# Patient Record
Sex: MALE | Race: WHITE | NOT HISPANIC OR LATINO | Employment: FULL TIME | URBAN - METROPOLITAN AREA
[De-identification: names, ages, dates, MRNs, and addresses within clinical notes are randomized per-mention and may not be internally consistent; named-entity substitution may affect disease eponyms.]

---

## 2018-07-20 ENCOUNTER — OFFICE VISIT (OUTPATIENT)
Dept: URGENT CARE | Facility: CLINIC | Age: 38
End: 2018-07-20
Payer: COMMERCIAL

## 2018-07-20 VITALS
DIASTOLIC BLOOD PRESSURE: 80 MMHG | HEIGHT: 71 IN | OXYGEN SATURATION: 96 % | SYSTOLIC BLOOD PRESSURE: 132 MMHG | HEART RATE: 70 BPM | TEMPERATURE: 99.1 F | BODY MASS INDEX: 35 KG/M2 | WEIGHT: 250 LBS | RESPIRATION RATE: 18 BRPM

## 2018-07-20 DIAGNOSIS — M71.331 SYNOVIAL CYST OF WRIST, RIGHT: Primary | ICD-10-CM

## 2018-07-20 PROCEDURE — 99203 OFFICE O/P NEW LOW 30 MIN: CPT | Performed by: EMERGENCY MEDICINE

## 2018-07-20 NOTE — PROGRESS NOTES
[facility] [employer] [facility]        NAME: Vilma Arango is a 34 y o  male  : 1980    MRN: [id number]  DATE: 2018  TIME: 6:50 PM    Assessment and Plan   Synovial cyst of wrist, right [M67 [id number]]  1  Synovial cyst of wrist, right  Ambulatory referral to Hand Surgery         Patient Instructions     Patient Instructions   Ganglion Cyst Removal   WHAT YOU NEED TO KNOW:   Ganglion cyst removal is surgery to remove a small fluid-filled sac that usually occurs in the hand, wrist, foot, or ankle  In some cases, it can occur in other parts of the body  DISCHARGE INSTRUCTIONS:   Follow up with your healthcare provider or surgeon as directed: You may need to return to have your stitches or bandage removed  Write down your questions so you remember to ask them during your visits  Wound care:  Keep your surgical incision clean and dry  Ask your healthcare provider or surgeon how to care for your wound and when you can bathe  Check for signs of infection, such as redness or swelling, when you clean your wound  Activities:  Avoid strenuous work and heavy lifting for several weeks or as directed by your healthcare provider or surgeon  You may be able to do light activities and light lifting  Ask what activities are okay for you to do  Contact your healthcare provider or surgeon if:   · You have a fever  · Your wound is red, swollen, or draining pus  · You have numbness or tingling of the arm, hand, leg, or foot  · You have questions or concerns about your condition or care  Seek care immediately or call 911 if:   · Blood soaks through your bandage  · You cannot move your arm, hand, leg, or foot  2017 [address] [facility] Information is for End User's use only and may not be sold, redistributed or otherwise used for commercial purposes  All illustrations and images included in CareNotes® are the copyrighted property of [employer]  or Suman Zhao    The above information is an  only  It is not intended as medical advice for individual conditions or treatments  Talk to your doctor, nurse or pharmacist before following any medical regimen to see if it is safe and effective for you  Ganglion Cysts   WHAT YOU NEED TO KNOW:   What is a ganglion cyst?  A ganglion cyst is an abnormal buildup of fluid under the skin  They are most common on the wrists, feet, or ankles, but can be found anywhere on the body  The cause is not known  You may have a higher risk for a ganglion cyst if you injure your joint  What are the signs and symptoms of a ganglion cyst?   · A round, firm lump    · A lump that changes size and may disappear or reappear    · Numbness, swelling, or muscle weakness around the joint where you have the cyst    · Pain in a joint that has the cyst  How is a ganglion cyst diagnosed? Your healthcare provider will examine the cyst and ask how long you have had it  He will move your joint around to feel the cyst and see if it moves  Tell him if the cyst interferes with your daily activities  You may need any of the following:  · An ultrasound  uses sound waves to show pictures of your cyst on a monitor  · An MRI  takes pictures of your joint to show the cyst  You may be given dye to help the joint show up better  Tell the healthcare provider if you have ever had an allergic reaction to contrast dye  Do not enter the MRI room with anything metal  Metal can cause serious injury  Tell the healthcare provider if you have any metal in or on your body  How is a ganglion cyst treated? A ganglion cyst will usually go away on its own  Do not try to pop or break the cyst yourself  This can cause it to return  You may need any of the following:  · Steroid medicine  may be injected into the cyst to decrease inflammation  · Aspiration  may be done to drain the cyst with a needle  · Surgery  may be needed to remove the cyst   How can I manage my symptoms?    · Go to hand therapy  A hand therapist teaches you exercises to help improve movement and strength, and to decrease pain  · Wear a splint as directed  to support and protect the joint that has the cyst  This will limit movement and help your cyst get smaller  · Care for your wound after aspiration or surgery  Care for the cyst area as directed  Rest your joint for 48 hours  Place ice on your wound for 15 to 20 minutes every hour or as directed  Use an ice pack, or put crushed ice in a plastic bag  Cover it with a towel  Ice helps prevent tissue damage and decreases swelling and pain  When should I contact my healthcare provider? · You continue to have pain, even after treatment  · Your cyst returns or gets larger  · Your limb that has the cyst gets weak, numb, stiff, or unstable  · You have questions or concerns about your condition or care  CARE AGREEMENT:   You have the right to help plan your care  Learn about your health condition and how it may be treated  Discuss treatment options with your caregivers to decide what care you want to receive  You always have the right to refuse treatment  The above information is an  only  It is not intended as medical advice for individual conditions or treatments  Talk to your doctor, nurse or pharmacist before following any medical regimen to see if it is safe and effective for you  © 2017 [address] [employer]  Information is for End User's use only and may not be sold, redistributed or otherwise used for commercial purposes  All illustrations and images included in CareNotes® are the copyrighted property of A D A [employer] , [employer]  or Suman Zhao  Follow up with PCP in 3-5 days  Proceed to  ER if symptoms worsen      Chief Complaint     Chief Complaint   Patient presents with    Mass     Patient discovered a hard lump on the radial aspect of right wrist          History of Present Illness       Patient is concerned about a painless lump on his right wrist for several months  Denies trauma to that area  Review of Systems   Review of Systems   Constitutional: Negative for chills and fever  Musculoskeletal: Negative for arthralgias, gait problem and joint swelling  Skin: Negative for color change, rash and wound  Neurological: Negative for numbness  Current Medications     No current outpatient prescriptions on file  Current Allergies     Allergies as of 07/20/2018    (No Known Allergies)            The following portions of the patient's history were reviewed and updated as appropriate: allergies, current medications, past family history, past medical history, past social history, past surgical history and problem list      History reviewed  No pertinent past medical history  Past Surgical History:   Procedure Laterality Date    HEMORRHOID SURGERY      LUMBAR DISCECTOMY         History reviewed  No pertinent family history  Medications have been verified  Objective   /80   Pulse 70   Temp 99 1 °F (37 3 °C) (Tympanic)   Resp 18   Ht 5' 11" (1 803 m)   Wt 113 kg (250 lb)   SpO2 96%   BMI 34 87 kg/m²        Physical Exam     Physical Exam   Constitutional: He is oriented to person, place, and time  He appears well-developed and well-nourished  No distress  Neck: Neck supple  Cardiovascular: Normal rate and regular rhythm  Pulmonary/Chest: Effort normal and breath sounds normal    Musculoskeletal: He exhibits no tenderness  Firm rubbery 1 cm diameter subcutaneous lesion attached to the flexor tendon at the right wrist    Neurological: He is alert and oriented to person, place, and time  Skin: Skin is warm and dry  No rash noted  No erythema  Nursing note and vitals reviewed

## 2018-07-20 NOTE — PATIENT INSTRUCTIONS
Ganglion Cyst Removal   WHAT YOU NEED TO KNOW:   Ganglion cyst removal is surgery to remove a small fluid-filled sac that usually occurs in the hand, wrist, foot, or ankle  In some cases, it can occur in other parts of the body  DISCHARGE INSTRUCTIONS:   Follow up with your healthcare provider or surgeon as directed: You may need to return to have your stitches or bandage removed  Write down your questions so you remember to ask them during your visits  Wound care:  Keep your surgical incision clean and dry  Ask your healthcare provider or surgeon how to care for your wound and when you can bathe  Check for signs of infection, such as redness or swelling, when you clean your wound  Activities:  Avoid strenuous work and heavy lifting for several weeks or as directed by your healthcare provider or surgeon  You may be able to do light activities and light lifting  Ask what activities are okay for you to do  Contact your healthcare provider or surgeon if:   · You have a fever  · Your wound is red, swollen, or draining pus  · You have numbness or tingling of the arm, hand, leg, or foot  · You have questions or concerns about your condition or care  Seek care immediately or call 911 if:   · Blood soaks through your bandage  · You cannot move your arm, hand, leg, or foot  © 2017 [address] [employer]  Information is for End User's use only and may not be sold, redistributed or otherwise used for commercial purposes  All illustrations and images included in CareNotes® are the copyrighted property of A D A M [employer] Inc  or Suman Zhao  The above information is an  only  It is not intended as medical advice for individual conditions or treatments  Talk to your doctor, nurse or pharmacist before following any medical regimen to see if it is safe and effective for you    Ganglion Cysts   WHAT YOU NEED TO KNOW:   What is a ganglion cyst?  A ganglion cyst is an abnormal buildup of fluid under the skin  They are most common on the wrists, feet, or ankles, but can be found anywhere on the body  The cause is not known  You may have a higher risk for a ganglion cyst if you injure your joint  What are the signs and symptoms of a ganglion cyst?   · A round, firm lump    · A lump that changes size and may disappear or reappear    · Numbness, swelling, or muscle weakness around the joint where you have the cyst    · Pain in a joint that has the cyst  How is a ganglion cyst diagnosed? Your healthcare provider will examine the cyst and ask how long you have had it  He will move your joint around to feel the cyst and see if it moves  Tell him if the cyst interferes with your daily activities  You may need any of the following:  · An ultrasound  uses sound waves to show pictures of your cyst on a monitor  · An MRI  takes pictures of your joint to show the cyst  You may be given dye to help the joint show up better  Tell the healthcare provider if you have ever had an allergic reaction to contrast dye  Do not enter the MRI room with anything metal  Metal can cause serious injury  Tell the healthcare provider if you have any metal in or on your body  How is a ganglion cyst treated? A ganglion cyst will usually go away on its own  Do not try to pop or break the cyst yourself  This can cause it to return  You may need any of the following:  · Steroid medicine  may be injected into the cyst to decrease inflammation  · Aspiration  may be done to drain the cyst with a needle  · Surgery  may be needed to remove the cyst   How can I manage my symptoms? · Go to hand therapy  A hand therapist teaches you exercises to help improve movement and strength, and to decrease pain  · Wear a splint as directed  to support and protect the joint that has the cyst  This will limit movement and help your cyst get smaller  · Care for your wound after aspiration or surgery    Care for the cyst area as directed  Rest your joint for 48 hours  Place ice on your wound for 15 to 20 minutes every hour or as directed  Use an ice pack, or put crushed ice in a plastic bag  Cover it with a towel  Ice helps prevent tissue damage and decreases swelling and pain  When should I contact my healthcare provider? · You continue to have pain, even after treatment  · Your cyst returns or gets larger  · Your limb that has the cyst gets weak, numb, stiff, or unstable  · You have questions or concerns about your condition or care  CARE AGREEMENT:   You have the right to help plan your care  Learn about your health condition and how it may be treated  Discuss treatment options with your caregivers to decide what care you want to receive  You always have the right to refuse treatment  The above information is an  only  It is not intended as medical advice for individual conditions or treatments  Talk to your doctor, nurse or pharmacist before following any medical regimen to see if it is safe and effective for you  © 2017 [address] Information is for End User's use only and may not be sold, redistributed or otherwise used for commercial purposes  All illustrations and images included in CareNotes® are the copyrighted property of A [employer]  or Suman Zhao

## 2020-04-24 ENCOUNTER — APPOINTMENT (OUTPATIENT)
Dept: RADIOLOGY | Facility: CLINIC | Age: 40
End: 2020-04-24
Payer: COMMERCIAL

## 2020-04-24 ENCOUNTER — OFFICE VISIT (OUTPATIENT)
Dept: URGENT CARE | Facility: CLINIC | Age: 40
End: 2020-04-24
Payer: COMMERCIAL

## 2020-04-24 VITALS
HEART RATE: 68 BPM | DIASTOLIC BLOOD PRESSURE: 91 MMHG | BODY MASS INDEX: 36.4 KG/M2 | TEMPERATURE: 98.4 F | RESPIRATION RATE: 16 BRPM | WEIGHT: 260 LBS | SYSTOLIC BLOOD PRESSURE: 136 MMHG | HEIGHT: 71 IN | OXYGEN SATURATION: 96 %

## 2020-04-24 DIAGNOSIS — S99.922A INJURY OF LEFT FOOT, INITIAL ENCOUNTER: ICD-10-CM

## 2020-04-24 DIAGNOSIS — S92.415A CLOSED NONDISPLACED FRACTURE OF PROXIMAL PHALANX OF LEFT GREAT TOE, INITIAL ENCOUNTER: Primary | ICD-10-CM

## 2020-04-24 PROCEDURE — 73630 X-RAY EXAM OF FOOT: CPT

## 2020-04-24 PROCEDURE — 99213 OFFICE O/P EST LOW 20 MIN: CPT | Performed by: EMERGENCY MEDICINE

## 2020-04-24 RX ORDER — TRAMADOL HYDROCHLORIDE 50 MG/1
50 TABLET ORAL EVERY 6 HOURS PRN
Qty: 8 TABLET | Refills: 0 | Status: SHIPPED | OUTPATIENT
Start: 2020-04-24

## 2020-04-24 RX ORDER — IBUPROFEN 200 MG
TABLET ORAL EVERY 6 HOURS PRN
COMMUNITY

## 2024-02-18 ENCOUNTER — OFFICE VISIT (OUTPATIENT)
Dept: URGENT CARE | Facility: CLINIC | Age: 44
End: 2024-02-18
Payer: COMMERCIAL

## 2024-02-18 VITALS
BODY MASS INDEX: 32.78 KG/M2 | OXYGEN SATURATION: 96 % | SYSTOLIC BLOOD PRESSURE: 136 MMHG | HEART RATE: 74 BPM | DIASTOLIC BLOOD PRESSURE: 84 MMHG | TEMPERATURE: 101 F | WEIGHT: 229 LBS | RESPIRATION RATE: 18 BRPM | HEIGHT: 70 IN

## 2024-02-18 DIAGNOSIS — R68.89 FLU-LIKE SYMPTOMS: Primary | ICD-10-CM

## 2024-02-18 LAB
SARS-COV-2 AG UPPER RESP QL IA: NEGATIVE
VALID CONTROL: NORMAL

## 2024-02-18 PROCEDURE — G0382 LEV 3 HOSP TYPE B ED VISIT: HCPCS | Performed by: EMERGENCY MEDICINE

## 2024-02-18 PROCEDURE — 87811 SARS-COV-2 COVID19 W/OPTIC: CPT | Performed by: EMERGENCY MEDICINE

## 2024-02-18 RX ORDER — PREDNISONE 10 MG/1
TABLET ORAL
Qty: 27 TABLET | Refills: 0 | Status: SHIPPED | OUTPATIENT
Start: 2024-02-18

## 2024-02-18 NOTE — PATIENT INSTRUCTIONS
You have been diagnosed with a Flu like illness and your symptoms should resolve over the next 7 to 10 days with the treatments recommended today.  If they do not, it is possible that you have developed a bacterial infection and you should return. If you were to take an antibiotic while you are still in the viral stage, you will not get better any faster, but could kill off good germs in your body as well as make germs resistant to the antibiotic.  Take an expectorant - guaifenesin should be the only ingredient - during the day, and the cough suppressant (ex. Robitussin DM or Tessalon) if needed at night only.   Take Zinc 50 mg every 12 hours for the next week (the dose is important so do not just take a multivitamin with zinc or an over-the-counter cold med with zinc such as Airborne or Zicam, as that will not give you the sufficient dose).  You should also take Quercetin 500 mg twice daily with it (again dose is important).  You should also take vitamin D3 5000 i.u.s per day for the next 1 week, and vitamin-C 1 g twice daily (and again dosages are important, do not think you are getting enough vitamin C just by drinking extra orange juice).  May take Flonase as discussed.  You may also take a decongestant like Sudafed, unless you have hypertension or cardiac disease.  You may take Imodium for diarrhea according to package instructions.     If you are diabetic you should adhere strictly to your diabetic diet and monitor blood sugar closely while on prednisone and you should discontinue the prednisone if blood sugar becomes significantly elevated.  Avoid nonsteroidal anti-inflammatories like Advil or Aleve while on prednisone.   Prophylactically self quarantine. Department of health's newest recommendations as of December 27,2021 state the following:     IF you TEST POSITIVE for COVID-19  -stay home for 5 days. If you have no symptoms or your symptoms are resolving after 5 days, you can leave your house. Continue  to wear a mask around others for 5 additional days. If you have a fever, continue to stay home until you fever resolves.        Drink lots of fluids to maintain hydration. Do not touch your face, wash hands often, and practice social distancing. There is no treatment for simple outpatient COVID-19 patients however, Those with severe illness, older age, or multiple comorbidities may qualify for monoclonal antibody infusions as treatment. Please call your doctor to see if you qualify. Call your family doctor to have a follow-up appointment in next few days. Go to ER if he began experiencing chest pain, shortness of breath, fever that is not responding to antipyretics or other severe symptoms.        Follow up with PCP in 3-5 days.  Proceed to ER if symptoms worsen.    Flu-like illness   AMBULATORY CARE:   Flu-like illness is an infection caused by a virus. The flu is easily spread when an infected person coughs, sneezes, or has close contact with others. You may be able to spread the flu to others for 1 week or longer after signs or symptoms appear.   Common signs and symptoms include the following:   Fever and chills    Headaches, body aches, and muscle or joint pain    Cough, runny nose, and sore throat    Loss of appetite, nausea, vomiting, or diarrhea    Tiredness    Trouble breathing  Call 911 for any of the following:   You have trouble breathing, and your lips look purple or blue.    You have a seizure.  Seek care immediately if:   You are dizzy, or you are urinating less or not at all.     You have a headache with a stiff neck, and you feel tired or confused.    You have new pain or pressure in your chest.    Your symptoms, such as shortness of breath, vomiting, or diarrhea, get worse.     Your symptoms, such as fever and coughing, seem to get better, but then get worse.  Contact your healthcare provider if:   You have new muscle pain or weakness.    You have questions or concerns about your condition or  care.  Treatment for influenza  may include any of the following:  Acetaminophen decreases pain and fever. It is available without a doctor's order. Ask how much to take and how often to take it. Follow directions. Acetaminophen can cause liver damage if not taken correctly.    NSAIDs  such as ibuprofen, help decrease swelling, pain, and fever. This medicine is available with or without a doctor's order. NSAIDs can cause stomach bleeding or kidney problems in certain people. If you take blood thinner medicine, always ask your healthcare provider if NSAIDs are safe for you. Always read the medicine label and follow directions.    Antivirals  help fight a viral infection.  Manage your symptoms:   Rest  as much as you can to help you recover.    Drink liquids as directed  to help prevent dehydration. Ask how much liquid to drink each day and which liquids are best for you.  Prevent the spread of the flu:   Wash your hands often.  Use soap and water. Wash your hands after you use the bathroom, change a child's diapers, or sneeze. Wash your hands before you prepare or eat food. Use gel hand cleanser when soap and water are not available. Do not touch your eyes, nose, or mouth unless you have washed your hands first.       Cover your mouth when you sneeze or cough.  Cough into a tissue or the bend of your arm.    Clean shared items with a germ-killing .  Clean table surfaces, doorknobs, and light switches. Do not share towels, silverware, and dishes with people who are sick. Wash bed sheets, towels, silverware, and dishes with soap and water.     Wear a mask  over your mouth and nose if you are sick or are near anyone who is sick.     Stay away from others  if you are sick.     Influenza vaccine  helps prevent influenza (flu). Everyone older than 6 months should get a yearly influenza vaccine. Get the vaccine as soon as it is available, usually in September or October each year.  Follow up with your healthcare  provider as directed:  Write down your questions so you remember to ask them during your visits.   © 2017 "[employer] Information is for End User's use only and may not be sold, redistributed or otherwise used for commercial purposes. All illustrations and images included in CareNotes® are the copyrighted property of [employer] or [employer].  The above information is an  only. It is not intended as medical advice for individual conditions or treatments. Talk to your doctor, nurse or pharmacist before following any medical regimen to see if it is safe and effective for you.

## 2024-02-18 NOTE — LETTER
February 18, 2024     Patient: Dario Diaz   YOB: 1980   Date of Visit: 2/18/2024       To Whom it May Concern:    Dario Diaz was seen in my clinic on 2/18/2024. He may return to work on 02/20/2024 .    If you have any questions or concerns, please don't hesitate to call.         Sincerely,          Guevara Razo MD        CC: No Recipients